# Patient Record
Sex: MALE | Race: WHITE | URBAN - METROPOLITAN AREA
[De-identification: names, ages, dates, MRNs, and addresses within clinical notes are randomized per-mention and may not be internally consistent; named-entity substitution may affect disease eponyms.]

---

## 2019-03-13 ENCOUNTER — OFFICE VISIT (OUTPATIENT)
Dept: FAMILY MEDICINE CLINIC | Age: 39
End: 2019-03-13
Payer: COMMERCIAL

## 2019-03-13 VITALS
WEIGHT: 172 LBS | HEART RATE: 65 BPM | BODY MASS INDEX: 25.48 KG/M2 | SYSTOLIC BLOOD PRESSURE: 118 MMHG | OXYGEN SATURATION: 96 % | DIASTOLIC BLOOD PRESSURE: 82 MMHG | HEIGHT: 69 IN

## 2019-03-13 DIAGNOSIS — R10.9 FLANK PAIN, ACUTE: Primary | ICD-10-CM

## 2019-03-13 DIAGNOSIS — Z00.00 ANNUAL PHYSICAL EXAM: ICD-10-CM

## 2019-03-13 PROCEDURE — 81000 URINALYSIS NONAUTO W/SCOPE: CPT | Performed by: NURSE PRACTITIONER

## 2019-03-13 PROCEDURE — 99395 PREV VISIT EST AGE 18-39: CPT | Performed by: NURSE PRACTITIONER

## 2019-03-13 SDOH — SOCIAL STABILITY: SOCIAL NETWORK
DO YOU BELONG TO ANY CLUBS OR ORGANIZATIONS SUCH AS CHURCH GROUPS UNIONS, FRATERNAL OR ATHLETIC GROUPS, OR SCHOOL GROUPS?: NO

## 2019-03-13 SDOH — ECONOMIC STABILITY: FOOD INSECURITY: WITHIN THE PAST 12 MONTHS, THE FOOD YOU BOUGHT JUST DIDN'T LAST AND YOU DIDN'T HAVE MONEY TO GET MORE.: NEVER TRUE

## 2019-03-13 SDOH — SOCIAL STABILITY: SOCIAL INSECURITY
WITHIN THE LAST YEAR, HAVE YOU BEEN KICKED, HIT, SLAPPED, OR OTHERWISE PHYSICALLY HURT BY YOUR PARTNER OR EX-PARTNER?: NO

## 2019-03-13 SDOH — HEALTH STABILITY: PHYSICAL HEALTH: ON AVERAGE, HOW MANY MINUTES DO YOU ENGAGE IN EXERCISE AT THIS LEVEL?: 120 MIN

## 2019-03-13 SDOH — HEALTH STABILITY: MENTAL HEALTH
STRESS IS WHEN SOMEONE FEELS TENSE, NERVOUS, ANXIOUS, OR CAN'T SLEEP AT NIGHT BECAUSE THEIR MIND IS TROUBLED. HOW STRESSED ARE YOU?: NOT AT ALL

## 2019-03-13 SDOH — SOCIAL STABILITY: SOCIAL NETWORK: HOW OFTEN DO YOU ATTEND CHURCH OR RELIGIOUS SERVICES?: 1 TO 4 TIMES PER YEAR

## 2019-03-13 SDOH — ECONOMIC STABILITY: TRANSPORTATION INSECURITY
IN THE PAST 12 MONTHS, HAS THE LACK OF TRANSPORTATION KEPT YOU FROM MEDICAL APPOINTMENTS OR FROM GETTING MEDICATIONS?: NO

## 2019-03-13 SDOH — SOCIAL STABILITY: SOCIAL INSECURITY
WITHIN THE LAST YEAR, HAVE TO BEEN RAPED OR FORCED TO HAVE ANY KIND OF SEXUAL ACTIVITY BY YOUR PARTNER OR EX-PARTNER?: NO

## 2019-03-13 SDOH — SOCIAL STABILITY: SOCIAL INSECURITY: WITHIN THE LAST YEAR, HAVE YOU BEEN HUMILIATED OR EMOTIONALLY ABUSED IN OTHER WAYS BY YOUR PARTNER OR EX-PARTNER?: NO

## 2019-03-13 SDOH — SOCIAL STABILITY: SOCIAL INSECURITY: WITHIN THE LAST YEAR, HAVE YOU BEEN AFRAID OF YOUR PARTNER OR EX-PARTNER?: NO

## 2019-03-13 SDOH — ECONOMIC STABILITY: TRANSPORTATION INSECURITY
IN THE PAST 12 MONTHS, HAS LACK OF TRANSPORTATION KEPT YOU FROM MEETINGS, WORK, OR FROM GETTING THINGS NEEDED FOR DAILY LIVING?: NO

## 2019-03-13 SDOH — SOCIAL STABILITY: SOCIAL NETWORK: HOW OFTEN DO YOU ATTENT MEETINGS OF THE CLUB OR ORGANIZATION YOU BELONG TO?: NEVER

## 2019-03-13 SDOH — SOCIAL STABILITY: SOCIAL NETWORK: HOW OFTEN DO YOU GET TOGETHER WITH FRIENDS OR RELATIVES?: ONCE A WEEK

## 2019-03-13 SDOH — HEALTH STABILITY: PHYSICAL HEALTH: ON AVERAGE, HOW MANY DAYS PER WEEK DO YOU ENGAGE IN MODERATE TO STRENUOUS EXERCISE (LIKE A BRISK WALK)?: 5 DAYS

## 2019-03-13 SDOH — SOCIAL STABILITY: SOCIAL NETWORK: IN A TYPICAL WEEK, HOW MANY TIMES DO YOU TALK ON THE PHONE WITH FAMILY, FRIENDS, OR NEIGHBORS?: TWICE A WEEK

## 2019-03-13 SDOH — ECONOMIC STABILITY: FOOD INSECURITY: WITHIN THE PAST 12 MONTHS, YOU WORRIED THAT YOUR FOOD WOULD RUN OUT BEFORE YOU GOT MONEY TO BUY MORE.: NEVER TRUE

## 2019-03-13 SDOH — ECONOMIC STABILITY: INCOME INSECURITY: HOW HARD IS IT FOR YOU TO PAY FOR THE VERY BASICS LIKE FOOD, HOUSING, MEDICAL CARE, AND HEATING?: NOT HARD AT ALL

## 2019-03-13 SDOH — SOCIAL STABILITY: SOCIAL NETWORK: ARE YOU MARRIED, WIDOWED, DIVORCED, SEPARATED, NEVER MARRIED, OR LIVING WITH A PARTNER?: NEVER MARRIED

## 2019-03-13 ASSESSMENT — PATIENT HEALTH QUESTIONNAIRE - PHQ9
SUM OF ALL RESPONSES TO PHQ9 QUESTIONS 1 & 2: 0
SUM OF ALL RESPONSES TO PHQ QUESTIONS 1-9: 0
2. FEELING DOWN, DEPRESSED OR HOPELESS: 0
SUM OF ALL RESPONSES TO PHQ QUESTIONS 1-9: 0
1. LITTLE INTEREST OR PLEASURE IN DOING THINGS: 0

## 2019-03-13 ASSESSMENT — ENCOUNTER SYMPTOMS
EYES NEGATIVE: 1
ALLERGIC/IMMUNOLOGIC NEGATIVE: 1
GASTROINTESTINAL NEGATIVE: 1
RESPIRATORY NEGATIVE: 1

## 2019-08-26 ENCOUNTER — OFFICE VISIT (OUTPATIENT)
Dept: FAMILY MEDICINE CLINIC | Age: 39
End: 2019-08-26
Payer: COMMERCIAL

## 2019-08-26 VITALS
TEMPERATURE: 100 F | SYSTOLIC BLOOD PRESSURE: 122 MMHG | BODY MASS INDEX: 24.4 KG/M2 | OXYGEN SATURATION: 98 % | DIASTOLIC BLOOD PRESSURE: 86 MMHG | WEIGHT: 165.2 LBS | HEART RATE: 82 BPM

## 2019-08-26 DIAGNOSIS — R50.9 FEVER, UNKNOWN ORIGIN: Primary | ICD-10-CM

## 2019-08-26 PROCEDURE — 99213 OFFICE O/P EST LOW 20 MIN: CPT | Performed by: NURSE PRACTITIONER

## 2019-08-26 ASSESSMENT — ENCOUNTER SYMPTOMS: COUGH: 1

## 2024-07-31 ENCOUNTER — OFFICE VISIT (OUTPATIENT)
Dept: FAMILY MEDICINE CLINIC | Age: 44
End: 2024-07-31

## 2024-07-31 VITALS
TEMPERATURE: 97.1 F | OXYGEN SATURATION: 96 % | SYSTOLIC BLOOD PRESSURE: 124 MMHG | DIASTOLIC BLOOD PRESSURE: 80 MMHG | HEIGHT: 69 IN | BODY MASS INDEX: 24.88 KG/M2 | HEART RATE: 65 BPM | WEIGHT: 168 LBS

## 2024-07-31 DIAGNOSIS — L98.9 SKIN LESION: Primary | ICD-10-CM

## 2024-07-31 DIAGNOSIS — L98.9 SKIN LESION OF LEFT ARM: ICD-10-CM

## 2024-07-31 RX ORDER — IMIQUIMOD 12.5 MG/.25G
CREAM TOPICAL
Qty: 24 EACH | Refills: 1 | Status: SHIPPED | OUTPATIENT
Start: 2024-07-31 | End: 2024-08-07

## 2024-07-31 SDOH — ECONOMIC STABILITY: FOOD INSECURITY: WITHIN THE PAST 12 MONTHS, THE FOOD YOU BOUGHT JUST DIDN'T LAST AND YOU DIDN'T HAVE MONEY TO GET MORE.: NEVER TRUE

## 2024-07-31 SDOH — ECONOMIC STABILITY: FOOD INSECURITY: WITHIN THE PAST 12 MONTHS, YOU WORRIED THAT YOUR FOOD WOULD RUN OUT BEFORE YOU GOT MONEY TO BUY MORE.: NEVER TRUE

## 2024-07-31 SDOH — ECONOMIC STABILITY: HOUSING INSECURITY
IN THE LAST 12 MONTHS, WAS THERE A TIME WHEN YOU DID NOT HAVE A STEADY PLACE TO SLEEP OR SLEPT IN A SHELTER (INCLUDING NOW)?: NO

## 2024-07-31 SDOH — ECONOMIC STABILITY: INCOME INSECURITY: HOW HARD IS IT FOR YOU TO PAY FOR THE VERY BASICS LIKE FOOD, HOUSING, MEDICAL CARE, AND HEATING?: NOT HARD AT ALL

## 2024-07-31 ASSESSMENT — ENCOUNTER SYMPTOMS
GASTROINTESTINAL NEGATIVE: 1
RESPIRATORY NEGATIVE: 1

## 2024-07-31 ASSESSMENT — PATIENT HEALTH QUESTIONNAIRE - PHQ9
SUM OF ALL RESPONSES TO PHQ QUESTIONS 1-9: 0
2. FEELING DOWN, DEPRESSED OR HOPELESS: NOT AT ALL
SUM OF ALL RESPONSES TO PHQ QUESTIONS 1-9: 0
1. LITTLE INTEREST OR PLEASURE IN DOING THINGS: NOT AT ALL
SUM OF ALL RESPONSES TO PHQ QUESTIONS 1-9: 0
SUM OF ALL RESPONSES TO PHQ QUESTIONS 1-9: 0
SUM OF ALL RESPONSES TO PHQ9 QUESTIONS 1 & 2: 0

## 2024-07-31 NOTE — PROGRESS NOTES
Obed Lakhani (:  1980) is a 43 y.o. male,Established patient, here for evaluation of the following chief complaint(s):  Rash (Armpit, groin and butt. On and off for couple months.)      ASSESSMENT/PLAN:  1. Skin lesion  Assessment & Plan:  Acute - new problem; appearance consistent with condyloma.  -Discussed possible diagnosis. Patient agreeable to biopsy. Verbal consent obtained after risks (infection, bleeding, scar, dyspigmentation), benefits and alternatives explained.   -Area(s) to be biopsied were marked with a surgical pen. Site(s) were cleansed with alcohol. Local anesthesia achieved with 1% lidocaine with epinephrine/sodium bicarbonate.  Punch biopsy performed with 2 mm punch. Hemostasis was achieved with aluminum chloride and manual pressure. The wound(s) were dressed with petrolatum and covered with a bandage. Specimen(s) sent to pathology. Pt educated re: risk of bleeding, infection, scar, discoloration, and wound care instructions.  Will send for pathology and follow up with results.  Will provide imiquimod for use to lesions 3 times a week.  Will follow-up with patient pending results.  Orders:  -     SURGICAL PATHOLOGY  -     SURGICAL PATHOLOGY  2. Skin lesion of left arm  Assessment & Plan:  New problem-uncertain etiology.  Lesion consistent with malignant melanoma versus nevi.  Punch biopsy obtained, specimen sent for pathology.  Patient instructed on postoperative care of site.  -Discussed possible diagnosis. Patient agreeable to biopsy. Verbal consent obtained after risks (infection, bleeding, scar, dyspigmentation), benefits and alternatives explained.   -Area(s) to be biopsied were marked with a surgical pen. Site(s) were cleansed with alcohol. Local anesthesia achieved with 1% lidocaine with epinephrine/sodium bicarbonate.  Punch biopsy performed with 2 mm punch.. Hemostasis was achieved with aluminum chloride and manual pressure. The wound(s) were dressed with petrolatum and covered

## 2024-07-31 NOTE — ASSESSMENT & PLAN NOTE
Acute - new problem; appearance consistent with condyloma.  -Discussed possible diagnosis. Patient agreeable to biopsy. Verbal consent obtained after risks (infection, bleeding, scar, dyspigmentation), benefits and alternatives explained.   -Area(s) to be biopsied were marked with a surgical pen. Site(s) were cleansed with alcohol. Local anesthesia achieved with 1% lidocaine with epinephrine/sodium bicarbonate.  Punch biopsy performed with 2 mm punch. Hemostasis was achieved with aluminum chloride and manual pressure. The wound(s) were dressed with petrolatum and covered with a bandage. Specimen(s) sent to pathology. Pt educated re: risk of bleeding, infection, scar, discoloration, and wound care instructions.  Will send for pathology and follow up with results.  Will provide imiquimod for use to lesions 3 times a week.  Will follow-up with patient pending results.

## 2024-08-01 PROBLEM — L98.9 SKIN LESION OF LEFT ARM: Status: ACTIVE | Noted: 2024-08-01

## 2024-08-01 NOTE — ASSESSMENT & PLAN NOTE
New problem-uncertain etiology.  Lesion consistent with malignant melanoma versus nevi.  Punch biopsy obtained, specimen sent for pathology.  Patient instructed on postoperative care of site.  -Discussed possible diagnosis. Patient agreeable to biopsy. Verbal consent obtained after risks (infection, bleeding, scar, dyspigmentation), benefits and alternatives explained.   -Area(s) to be biopsied were marked with a surgical pen. Site(s) were cleansed with alcohol. Local anesthesia achieved with 1% lidocaine with epinephrine/sodium bicarbonate.  Punch biopsy performed with 2 mm punch.. Hemostasis was achieved with aluminum chloride and manual pressure. The wound(s) were dressed with petrolatum and covered with a bandage. Specimen(s) sent to pathology. Pt educated re: risk of bleeding, infection, scar, discoloration, and wound care instructions.

## 2024-08-06 ENCOUNTER — TELEPHONE (OUTPATIENT)
Dept: ADMINISTRATIVE | Age: 44
End: 2024-08-06

## 2024-08-06 ENCOUNTER — OFFICE VISIT (OUTPATIENT)
Dept: FAMILY MEDICINE CLINIC | Age: 44
End: 2024-08-06
Payer: COMMERCIAL

## 2024-08-06 DIAGNOSIS — R21 RASH AND NONSPECIFIC SKIN ERUPTION: Primary | ICD-10-CM

## 2024-08-06 PROCEDURE — 99213 OFFICE O/P EST LOW 20 MIN: CPT | Performed by: NURSE PRACTITIONER

## 2024-08-06 RX ORDER — TACROLIMUS 1 MG/G
OINTMENT TOPICAL
Qty: 100 G | Refills: 2 | Status: SHIPPED | OUTPATIENT
Start: 2024-08-06 | End: 2024-08-07

## 2024-08-06 RX ORDER — CLOBETASOL PROPIONATE 0.5 MG/G
OINTMENT TOPICAL
Qty: 60 G | Refills: 3 | Status: SHIPPED | OUTPATIENT
Start: 2024-08-06 | End: 2024-08-07

## 2024-08-06 NOTE — TELEPHONE ENCOUNTER
Submitted PA for Tacrolimus 0.1% ointment  Via CMM Key: [id number]  STATUS: PENDING.    Follow up done daily; if no decision with in three days we will refax.  If another three days goes by with no decision will call the insurance for status.

## 2024-08-06 NOTE — PROGRESS NOTES
Obed Lakhani (:  1980) is a 43 y.o. male,Established patient, here for evaluation of the following chief complaint(s):  Results      ASSESSMENT/PLAN:  1. Rash and nonspecific skin eruption  Assessment & Plan:  Acute- ongoing  Ddx to include numular derm vs psoriasis  Bx results inconclusive  Protopic for intertriginous regions  Clobetasol to head  Follow up with derm- report forwarded to dermatology for additional review  Orders:  -     tacrolimus (PROTOPIC) 0.1 % ointment; Apply topically 2 times daily to genitalia, groin and all skin folds, Disp-100 g, R-2, Normal  -     Baltazar Carpio MD, Dermatology, [place]    No follow-ups on file.    SUBJECTIVE/OBJECTIVE:  2 months worsening rash  Started in bilat axilla and appeared in groin  Favor psoriasis, inflamed numular derm, psoriasis    Current Outpatient Medications   Medication Sig Dispense Refill    tacrolimus (PROTOPIC) 0.1 % ointment Apply topically 2 times daily to genitalia, groin and all skin folds 100 g 2    clobetasol (TEMOVATE) 0.05 % ointment Apply topically to scalp 2 times daily. 60 g 3     No current facility-administered medications for this visit.     MEDICATION LIST REVIEWED AND UPDATED AT TIME OF VISIT    Review of Systems   All other systems reviewed and are negative.      There were no vitals filed for this visit.    Physical Exam  Skin:     Findings: Rash present.         Anus    Left groin    Rt groin    Penile sulcus and head            Left axilla    Rt axilla        An electronic signature was used to authenticate this note.    --NIMISHA QUINONES, EULALIA - CNP

## 2024-08-06 NOTE — ASSESSMENT & PLAN NOTE
Acute- ongoing  Ddx to include numular derm vs psoriasis  Bx results inconclusive  Protopic for intertriginous regions  Clobetasol to head  Follow up with derm- report forwarded to dermatology for additional review

## 2024-08-07 RX ORDER — CLOBETASOL PROPIONATE 0.5 MG/G
OINTMENT TOPICAL
Qty: 60 G | Refills: 3 | Status: SHIPPED | OUTPATIENT
Start: 2024-08-07

## 2024-08-07 RX ORDER — TACROLIMUS 1 MG/G
OINTMENT TOPICAL
Qty: 100 G | Refills: 2 | Status: SHIPPED | OUTPATIENT
Start: 2024-08-07

## 2024-08-07 NOTE — TELEPHONE ENCOUNTER
DENIAL for Tacrolimus 0.1% ointment due to quantity limit; however, patient can get up to 2 grams per day without a PA.  Letter attached.    Please advise the pharmacy to run the claim for 2 grams per day.  Thank you!

## 2024-08-13 ENCOUNTER — TELEPHONE (OUTPATIENT)
Dept: DERMATOLOGY | Age: 44
End: 2024-08-13

## 2024-08-13 NOTE — TELEPHONE ENCOUNTER
Patient referred by Joanie Ochoa CNP for rash.  Dr Zavala agreeable to see patient.     Called and left message for patient to call back office.     Please offer patient opening for today @ 4:45pm if still available.

## 2024-08-14 NOTE — TELEPHONE ENCOUNTER
Called and left message for patient to call back office.      Please offer patient opening for 8/15 if still available.

## 2024-08-15 NOTE — TELEPHONE ENCOUNTER
Multiple attempts were made to contact patient with no success.   Will close out of telephone encounter.

## 2024-08-26 ENCOUNTER — OFFICE VISIT (OUTPATIENT)
Dept: FAMILY MEDICINE CLINIC | Age: 44
End: 2024-08-26
Payer: COMMERCIAL

## 2024-08-26 VITALS
HEART RATE: 69 BPM | HEIGHT: 69 IN | OXYGEN SATURATION: 99 % | DIASTOLIC BLOOD PRESSURE: 82 MMHG | WEIGHT: 171 LBS | TEMPERATURE: 96.9 F | BODY MASS INDEX: 25.33 KG/M2 | SYSTOLIC BLOOD PRESSURE: 116 MMHG

## 2024-08-26 DIAGNOSIS — M79.672 LEFT FOOT PAIN: ICD-10-CM

## 2024-08-26 DIAGNOSIS — R21 RASH AND NONSPECIFIC SKIN ERUPTION: Primary | ICD-10-CM

## 2024-08-26 PROCEDURE — G2211 COMPLEX E/M VISIT ADD ON: HCPCS | Performed by: NURSE PRACTITIONER

## 2024-08-26 PROCEDURE — 99214 OFFICE O/P EST MOD 30 MIN: CPT | Performed by: NURSE PRACTITIONER

## 2024-08-26 NOTE — ASSESSMENT & PLAN NOTE
Oh my goodness this is so much improved.  Although he does have occasional small scaly lesions appear these are all now well-controlled with the use of tacrolimus and clobetasol to his scalp.  He is almost 99% better than when previously seen in office.  I did tell him however that this may recur over time, we do not know exactly what triggers it is likely an autoimmune issue.  If he wishes to consider any sort of oral treatment with Biologics he would need to be seen by dermatology.  I have encouraged him to call Dr. Anshul Corona office to schedule an appointment to be seen.  This is something that would have to be taken care of within dermatology.

## 2024-08-26 NOTE — PROGRESS NOTES
Obed Lakhani (:  1980) is a 43 y.o. male,Established patient, here for evaluation of the following chief complaint(s):  Foot Swelling (Left bottom feet x 1 week. )         Assessment & Plan  Rash and nonspecific skin eruption    Oh my goodness this is so much improved.  Although he does have occasional small scaly lesions appear these are all now well-controlled with the use of tacrolimus and clobetasol to his scalp.  He is almost 99% better than when previously seen in office.  I did tell him however that this may recur over time, we do not know exactly what triggers it is likely an autoimmune issue.  If he wishes to consider any sort of oral treatment with Biologics he would need to be seen by dermatology.  I have encouraged him to call Dr. Anshul Corona office to schedule an appointment to be seen.  This is something that would have to be taken care of within dermatology.         Left foot pain    New problem, uncontrolled.  Symptoms consistent with Kohler's neuroma.  Patient given referral to orthopedics to have this evaluated.  In the interim he may try soft soled shoes, he may give himself a boot online from [employer] to help alleviate pressure on his forefoot.  He may take anti-inflammatories as he is able.    Orders:    Rodrigo Gibbs MD, Orthopedic Surgery (Foot, Ankle), [facility]      No follow-ups on file.       Subjective   Patient presents with 1 week of pain in the forefoot of his right foot.  He denies any injury to this however he did state he has been doing a lot of furniture moving the last week which required a lot of up-and-down stairs.  He states he feels pain and swelling in the pad between his first and second toe on that foot.  Additionally he reports his skin lesions have significantly improved with the use of tacrolimus and clobetasol.  They were almost completely healed at this time.      No Known Allergies    Current Outpatient Medications   Medication Sig Dispense

## 2024-08-27 ENCOUNTER — TELEPHONE (OUTPATIENT)
Dept: DERMATOLOGY | Age: 44
End: 2024-08-27

## 2024-09-12 ENCOUNTER — OFFICE VISIT (OUTPATIENT)
Dept: ORTHOPEDIC SURGERY | Age: 44
End: 2024-09-12

## 2024-09-12 VITALS — HEIGHT: 69 IN | BODY MASS INDEX: 25.33 KG/M2 | WEIGHT: 171 LBS

## 2024-09-12 DIAGNOSIS — M79.672 LEFT FOOT PAIN: ICD-10-CM

## 2024-09-12 DIAGNOSIS — M77.42 METATARSALGIA, LEFT FOOT: Primary | ICD-10-CM

## 2024-09-12 RX ORDER — NAPROXEN 500 MG/1
500 TABLET ORAL 2 TIMES DAILY WITH MEALS
Qty: 60 TABLET | Refills: 0 | Status: SHIPPED | OUTPATIENT
Start: 2024-09-12 | End: 2024-10-12

## 2024-10-16 ENCOUNTER — OFFICE VISIT (OUTPATIENT)
Dept: DERMATOLOGY | Age: 44
End: 2024-10-16
Payer: COMMERCIAL

## 2024-10-16 DIAGNOSIS — R21 RASH: Primary | ICD-10-CM

## 2024-10-16 PROCEDURE — 11102 TANGNTL BX SKIN SINGLE LES: CPT | Performed by: DERMATOLOGY

## 2024-10-16 PROCEDURE — 99203 OFFICE O/P NEW LOW 30 MIN: CPT | Performed by: DERMATOLOGY

## 2024-10-16 NOTE — PATIENT INSTRUCTIONS

## 2024-10-16 NOTE — PROGRESS NOTES
[facility] Dermatology  Baltazar Zavala MD  [phone]      Obed Lakhani  1980    43 y.o. male     Date of Visit: 10/16/2024    Chief Complaint: rash    History of Present Illness:    He presents today for 2-month history of an initially generalized and more severe eruption on the scalp, trunk and extremities including the groin and buttocks.  Biopsy performed by his primary care provider on 7/31/2024 revealed irregular epidermal acanthosis with pigment incontinence with overlying parakeratosis.  No diagnosis was rendered on the report.  He reports improvement with use of tacrolimus 0.1% ointment particularly in the groin and buttocks.  He had COVID prior to onset of the eruption.  He denies any strep throat.  He is monogamous with 1 partner for the last 2 years.      Review of Systems:  Gen: Feels well, good sense of health.  Musculoskeletal: No joint stiffness or pain.    Past Medical History, Family History, Surgical History, Medications and Allergies reviewed.    Past Medical History:   Diagnosis Date    Kidney stone      History reviewed. No pertinent surgical history.    No Known Allergies  Outpatient Medications Marked as Taking for the 10/16/24 encounter (Office Visit) with Baltazar Zavala MD   Medication Sig Dispense Refill    medical marijuana Take by mouth as needed.      naproxen (NAPROSYN) 500 MG tablet Take 1 tablet by mouth 2 times daily (with meals) 60 tablet 0    clobetasol (TEMOVATE) 0.05 % ointment Apply topically to scalp 2 times daily. 60 g 3    tacrolimus (PROTOPIC) 0.1 % ointment Apply 2 gm topically 2 times daily to genitalia, groin and all skin folds 100 g 2           Physical Examination       Well-appearing.    Scalp, trunk, and extremities including hands with several scattered small round scaly pink macules and patches.  Prior pictures reviewed with prominent well-defined smooth brightly erythematous smooth patches on the perianal region and proximal inner

## 2024-10-22 DIAGNOSIS — Z79.899 HIGH RISK MEDICATION USE: Primary | ICD-10-CM

## 2024-10-22 LAB — DERMATOLOGY PATHOLOGY REPORT: NORMAL

## 2024-10-23 ENCOUNTER — TELEPHONE (OUTPATIENT)
Dept: DERMATOLOGY | Age: 44
End: 2024-10-23

## 2024-10-24 ENCOUNTER — HOSPITAL ENCOUNTER (OUTPATIENT)
Age: 44
Discharge: HOME | End: 2024-10-24
Payer: COMMERCIAL

## 2024-10-24 DIAGNOSIS — Z79.899 ENCOUNTER FOR LONG-TERM (CURRENT) USE OF MEDICATIONS: Primary | ICD-10-CM

## 2024-10-24 DIAGNOSIS — Z79.899 HIGH RISK MEDICATION USE: ICD-10-CM

## 2024-10-24 LAB
ALBUMIN SERPL-MCNC: 4.6 G/DL (ref 3.4–5)
ALP SERPL-CCNC: 75 U/L (ref 40–129)
ALT SERPL-CCNC: 20 U/L (ref 10–40)
ANION GAP SERPL CALCULATED.3IONS-SCNC: 9 MMOL/L (ref 3–16)
AST SERPL-CCNC: 19 U/L (ref 15–37)
BASOPHILS # BLD: 0.1 K/UL (ref 0–0.2)
BASOPHILS NFR BLD: 0.9 %
BILIRUB DIRECT SERPL-MCNC: 0.2 MG/DL (ref 0–0.3)
BILIRUB INDIRECT SERPL-MCNC: 0.4 MG/DL (ref 0–1)
BILIRUB SERPL-MCNC: 0.6 MG/DL (ref 0–1)
BUN SERPL-MCNC: 11 MG/DL (ref 7–20)
CALCIUM SERPL-MCNC: 9.9 MG/DL (ref 8.3–10.6)
CHLORIDE SERPL-SCNC: 100 MMOL/L (ref 99–110)
CO2 SERPL-SCNC: 28 MMOL/L (ref 21–32)
CREAT SERPL-MCNC: 1.1 MG/DL (ref 0.9–1.3)
DEPRECATED RDW RBC AUTO: 15.7 % (ref 12.4–15.4)
EOSINOPHIL # BLD: 0.4 K/UL (ref 0–0.6)
EOSINOPHIL NFR BLD: 5.8 %
GFR SERPLBLD CREATININE-BSD FMLA CKD-EPI: 85 ML/MIN/{1.73_M2}
GLUCOSE SERPL-MCNC: 110 MG/DL (ref 70–99)
HBV CORE IGM SERPL QL IA: NORMAL
HBV SURFACE AB SERPL IA-ACNC: <3.5 MIU/ML
HBV SURFACE AG SERPL QL IA: NORMAL
HCT VFR BLD AUTO: 45.1 % (ref 40.5–52.5)
HCV AB SERPL QL IA: NORMAL
HGB BLD-MCNC: 15.6 G/DL (ref 13.5–17.5)
LYMPHOCYTES # BLD: 2.9 K/UL (ref 1–5.1)
LYMPHOCYTES NFR BLD: 41.7 %
MCH RBC QN AUTO: 28.8 PG (ref 26–34)
MCHC RBC AUTO-ENTMCNC: 34.5 G/DL (ref 31–36)
MCV RBC AUTO: 83.7 FL (ref 80–100)
MONOCYTES # BLD: 0.4 K/UL (ref 0–1.3)
MONOCYTES NFR BLD: 6.2 %
NEUTROPHILS # BLD: 3.2 K/UL (ref 1.7–7.7)
NEUTROPHILS NFR BLD: 45.4 %
PHOSPHATE SERPL-MCNC: 2.4 MG/DL (ref 2.5–4.9)
PLATELET # BLD AUTO: 298 K/UL (ref 135–450)
PMV BLD AUTO: 9.1 FL (ref 5–10.5)
POTASSIUM SERPL-SCNC: 4.2 MMOL/L (ref 3.5–5.1)
PROT SERPL-MCNC: 7.3 G/DL (ref 6.4–8.2)
RBC # BLD AUTO: 5.39 M/UL (ref 4.2–5.9)
SODIUM SERPL-SCNC: 137 MMOL/L (ref 136–145)
WBC # BLD AUTO: 7 K/UL (ref 4–11)

## 2024-10-24 PROCEDURE — 86705 HEP B CORE ANTIBODY IGM: CPT

## 2024-10-24 PROCEDURE — 80076 HEPATIC FUNCTION PANEL: CPT

## 2024-10-24 PROCEDURE — 86706 HEP B SURFACE ANTIBODY: CPT

## 2024-10-24 PROCEDURE — 80069 RENAL FUNCTION PANEL: CPT

## 2024-10-24 PROCEDURE — 36415 COLL VENOUS BLD VENIPUNCTURE: CPT

## 2024-10-24 PROCEDURE — 87340 HEPATITIS B SURFACE AG IA: CPT

## 2024-10-24 PROCEDURE — 86803 HEPATITIS C AB TEST: CPT

## 2024-10-24 PROCEDURE — 85025 COMPLETE CBC W/AUTO DIFF WBC: CPT

## 2024-10-24 RX ORDER — FOLIC ACID 1 MG/1
1 TABLET ORAL DAILY
Qty: 90 TABLET | Refills: 1 | Status: SHIPPED | OUTPATIENT
Start: 2024-10-24

## 2024-10-24 RX ORDER — METHOTREXATE 2.5 MG/1
15 TABLET ORAL WEEKLY
Qty: 24 TABLET | Refills: 2 | Status: SHIPPED | OUTPATIENT
Start: 2024-10-24

## 2024-11-14 ENCOUNTER — HOSPITAL ENCOUNTER (OUTPATIENT)
Age: 44
Discharge: HOME | End: 2024-11-14
Payer: COMMERCIAL

## 2024-11-14 DIAGNOSIS — Z79.899 ENCOUNTER FOR LONG-TERM (CURRENT) USE OF MEDICATIONS: ICD-10-CM

## 2024-11-14 LAB
ALT SERPL-CCNC: 35 U/L (ref 10–40)
AST SERPL-CCNC: 22 U/L (ref 15–37)
BASOPHILS # BLD: 0.1 K/UL (ref 0–0.2)
BASOPHILS NFR BLD: 1.1 %
CREAT SERPL-MCNC: 1.1 MG/DL (ref 0.9–1.3)
DEPRECATED RDW RBC AUTO: 15.9 % (ref 12.4–15.4)
EOSINOPHIL # BLD: 0.3 K/UL (ref 0–0.6)
EOSINOPHIL NFR BLD: 4.4 %
GFR SERPLBLD CREATININE-BSD FMLA CKD-EPI: 85 ML/MIN/{1.73_M2}
HCT VFR BLD AUTO: 44.1 % (ref 40.5–52.5)
HGB BLD-MCNC: 15.1 G/DL (ref 13.5–17.5)
LYMPHOCYTES # BLD: 2.7 K/UL (ref 1–5.1)
LYMPHOCYTES NFR BLD: 36 %
MCH RBC QN AUTO: 28.8 PG (ref 26–34)
MCHC RBC AUTO-ENTMCNC: 34.2 G/DL (ref 31–36)
MCV RBC AUTO: 84.3 FL (ref 80–100)
MONOCYTES # BLD: 0.5 K/UL (ref 0–1.3)
MONOCYTES NFR BLD: 7.1 %
NEUTROPHILS # BLD: 3.8 K/UL (ref 1.7–7.7)
NEUTROPHILS NFR BLD: 51.4 %
PLATELET # BLD AUTO: 313 K/UL (ref 135–450)
PMV BLD AUTO: 9 FL (ref 5–10.5)
RBC # BLD AUTO: 5.23 M/UL (ref 4.2–5.9)
WBC # BLD AUTO: 7.4 K/UL (ref 4–11)

## 2024-11-14 PROCEDURE — 85025 COMPLETE CBC W/AUTO DIFF WBC: CPT

## 2024-11-14 PROCEDURE — 36415 COLL VENOUS BLD VENIPUNCTURE: CPT

## 2024-11-14 PROCEDURE — 82565 ASSAY OF CREATININE: CPT

## 2024-11-14 PROCEDURE — 84450 TRANSFERASE (AST) (SGOT): CPT

## 2024-11-14 PROCEDURE — 84460 ALANINE AMINO (ALT) (SGPT): CPT

## 2025-01-27 ENCOUNTER — OFFICE VISIT (OUTPATIENT)
Age: 45
End: 2025-01-27
Payer: COMMERCIAL

## 2025-01-27 DIAGNOSIS — L40.9 PSORIASIS: Primary | ICD-10-CM

## 2025-01-27 PROCEDURE — 99213 OFFICE O/P EST LOW 20 MIN: CPT | Performed by: DERMATOLOGY

## 2025-01-27 NOTE — PROGRESS NOTES
[facility] Dermatology  Baltazar Zavala MD  [phone]      Obed Lakhani  1980    44 y.o. male     Date of Visit: 1/27/2025    Chief Complaint: psoriasis    History of Present Illness:    History of Present Illness  The patient is a 44-year-old  male presenting for a follow-up evaluation of psoriasis.    Psoriasis  - The onset of his condition occurred 5 months ago following a COVID-19 infection.  - A biopsy of the left ankle confirmed the diagnosis of psoriasis.  - Methotrexate has been effective in managing his symptoms, although he experiences a recurrence towards the end of the week.  He only took methotrexate for 1 month due to some confusion about how long he was to be on it.  - He manages intermittent psoriatic lesions in the inguinal and gluteal regions, as well as occasional xerotic patches on the penis, with tacrolimus 0.1% ointment.   - Soreness and erythema in the inguinal folds are also alleviated by tacrolimus.  - Lesions in the axillary regions have largely resolved, and scalp pruritus has subsided.  -He had some fatigue for a day or so when taking methotrexate but otherwise tolerated it well.  - A small psoriatic plaque on his elbow is asymptomatic, and lesions on his foot have resolved.    Supplemental information: The patient reports a history of hemorrhoids.    MEDICATIONS  Current: methotrexate, folic acid, tacrolimus        Past Medical History, Family History, Surgical History, Medications and Allergies reviewed.    Past Medical History:   Diagnosis Date    Kidney stone      No past surgical history on file.    No Known Allergies  Outpatient Medications Marked as Taking for the 1/27/25 encounter (Office Visit) with Baltazar Zavala MD   Medication Sig Dispense Refill    methotrexate (RHEUMATREX) 2.5 MG chemo tablet Take 6 tablets by mouth once a week 24 tablet 2    folic acid (FOLVITE) 1 MG tablet Take 1 tablet by mouth daily 90 tablet 1    medical marijuana Take

## 2025-04-01 ENCOUNTER — PATIENT MESSAGE (OUTPATIENT)
Age: 45
End: 2025-04-01

## 2025-04-01 DIAGNOSIS — K64.9 HEMORRHOIDS, UNSPECIFIED HEMORRHOID TYPE: Primary | ICD-10-CM

## 2025-04-08 ENCOUNTER — OFFICE VISIT (OUTPATIENT)
Dept: FAMILY MEDICINE CLINIC | Age: 45
End: 2025-04-08
Payer: COMMERCIAL

## 2025-04-08 VITALS
BODY MASS INDEX: 25.62 KG/M2 | SYSTOLIC BLOOD PRESSURE: 138 MMHG | WEIGHT: 173 LBS | HEART RATE: 78 BPM | DIASTOLIC BLOOD PRESSURE: 80 MMHG | HEIGHT: 69 IN | OXYGEN SATURATION: 98 %

## 2025-04-08 DIAGNOSIS — G43.E09 CHRONIC MIGRAINE WITH AURA WITHOUT STATUS MIGRAINOSUS, NOT INTRACTABLE: ICD-10-CM

## 2025-04-08 DIAGNOSIS — M25.542 ARTHRALGIA OF LEFT HAND: ICD-10-CM

## 2025-04-08 DIAGNOSIS — Z00.00 ANNUAL PHYSICAL EXAM: ICD-10-CM

## 2025-04-08 DIAGNOSIS — R10.9 FLANK PAIN: Primary | ICD-10-CM

## 2025-04-08 PROCEDURE — G2211 COMPLEX E/M VISIT ADD ON: HCPCS | Performed by: NURSE PRACTITIONER

## 2025-04-08 PROCEDURE — 99215 OFFICE O/P EST HI 40 MIN: CPT | Performed by: NURSE PRACTITIONER

## 2025-04-08 RX ORDER — IBUPROFEN 200 MG
200 TABLET ORAL EVERY 6 HOURS PRN
COMMUNITY

## 2025-04-08 SDOH — ECONOMIC STABILITY: FOOD INSECURITY: WITHIN THE PAST 12 MONTHS, THE FOOD YOU BOUGHT JUST DIDN'T LAST AND YOU DIDN'T HAVE MONEY TO GET MORE.: NEVER TRUE

## 2025-04-08 SDOH — ECONOMIC STABILITY: FOOD INSECURITY: WITHIN THE PAST 12 MONTHS, YOU WORRIED THAT YOUR FOOD WOULD RUN OUT BEFORE YOU GOT MONEY TO BUY MORE.: NEVER TRUE

## 2025-04-08 ASSESSMENT — PATIENT HEALTH QUESTIONNAIRE - PHQ9
SUM OF ALL RESPONSES TO PHQ QUESTIONS 1-9: 0
SUM OF ALL RESPONSES TO PHQ QUESTIONS 1-9: 0
1. LITTLE INTEREST OR PLEASURE IN DOING THINGS: NOT AT ALL
SUM OF ALL RESPONSES TO PHQ QUESTIONS 1-9: 0
SUM OF ALL RESPONSES TO PHQ QUESTIONS 1-9: 0
2. FEELING DOWN, DEPRESSED OR HOPELESS: NOT AT ALL

## 2025-04-08 NOTE — PROGRESS NOTES
The patient (or guardian, if applicable) and other individuals in attendance at the appointment consented to the use of AI, including the recording.          An electronic signature was used to authenticate this note.    --EULALIA PINON - CNP

## 2025-04-08 NOTE — PROGRESS NOTES
Systems   Neurological:  Positive for headaches.          Objective   Vitals:    04/08/25 1235   BP: 138/80   BP Site: Left Upper Arm   Patient Position: Sitting   BP Cuff Size: Medium Adult   Pulse: 78   SpO2: 98%   Weight: 78.5 kg (173 lb)   Height: 1.753 m (5' 9.02\")       Physical Exam             An electronic signature was used to authenticate this note.    --NIMISHA QUINONES, EULALIA - CNP

## 2025-04-09 ENCOUNTER — RESULTS FOLLOW-UP (OUTPATIENT)
Dept: FAMILY MEDICINE CLINIC | Age: 45
End: 2025-04-09

## 2025-04-09 ENCOUNTER — HOSPITAL ENCOUNTER (OUTPATIENT)
Age: 45
Discharge: HOME | End: 2025-04-09
Payer: COMMERCIAL

## 2025-04-09 DIAGNOSIS — Z00.00 ANNUAL PHYSICAL EXAM: ICD-10-CM

## 2025-04-09 LAB
ALBUMIN SERPL-MCNC: 4.9 G/DL (ref 3.4–5)
ALBUMIN/GLOB SERPL: 1.8 {RATIO} (ref 1.1–2.2)
ALP SERPL-CCNC: 80 U/L (ref 40–129)
ALT SERPL-CCNC: 26 U/L (ref 10–40)
ANION GAP SERPL CALCULATED.3IONS-SCNC: 12 MMOL/L (ref 3–16)
AST SERPL-CCNC: 18 U/L (ref 15–37)
BASOPHILS # BLD: 0.1 K/UL (ref 0–0.2)
BASOPHILS NFR BLD: 1.3 %
BILIRUB SERPL-MCNC: 0.4 MG/DL (ref 0–1)
BILIRUB UR QL STRIP.AUTO: NEGATIVE
BUN SERPL-MCNC: 14 MG/DL (ref 7–20)
CALCIUM SERPL-MCNC: 9.8 MG/DL (ref 8.3–10.6)
CHLORIDE SERPL-SCNC: 102 MMOL/L (ref 99–110)
CHOLEST SERPL-MCNC: 224 MG/DL (ref 0–199)
CLARITY UR: CLEAR
CO2 SERPL-SCNC: 26 MMOL/L (ref 21–32)
COLOR UR: YELLOW
CREAT SERPL-MCNC: 0.9 MG/DL (ref 0.9–1.3)
DEPRECATED RDW RBC AUTO: 14.5 % (ref 12.4–15.4)
EOSINOPHIL # BLD: 0.4 K/UL (ref 0–0.6)
EOSINOPHIL NFR BLD: 5 %
GFR SERPLBLD CREATININE-BSD FMLA CKD-EPI: >90 ML/MIN/{1.73_M2}
GLUCOSE SERPL-MCNC: 109 MG/DL (ref 70–99)
GLUCOSE UR STRIP.AUTO-MCNC: NEGATIVE MG/DL
HCT VFR BLD AUTO: 47 % (ref 40.5–52.5)
HDLC SERPL-MCNC: 40 MG/DL (ref 40–60)
HGB BLD-MCNC: 15.7 G/DL (ref 13.5–17.5)
HGB UR QL STRIP.AUTO: NEGATIVE
KETONES UR STRIP.AUTO-MCNC: NEGATIVE MG/DL
LDLC SERPL CALC-MCNC: 125 MG/DL
LEUKOCYTE ESTERASE UR QL STRIP.AUTO: NEGATIVE
LYMPHOCYTES # BLD: 2.3 K/UL (ref 1–5.1)
LYMPHOCYTES NFR BLD: 32 %
MCH RBC QN AUTO: 28.8 PG (ref 26–34)
MCHC RBC AUTO-ENTMCNC: 33.4 G/DL (ref 31–36)
MCV RBC AUTO: 86.4 FL (ref 80–100)
MONOCYTES # BLD: 0.4 K/UL (ref 0–1.3)
MONOCYTES NFR BLD: 5.7 %
NEUTROPHILS # BLD: 4 K/UL (ref 1.7–7.7)
NEUTROPHILS NFR BLD: 56 %
NITRITE UR QL STRIP.AUTO: NEGATIVE
PH UR STRIP.AUTO: 6 [PH] (ref 5–8)
PLATELET # BLD AUTO: 305 K/UL (ref 135–450)
PMV BLD AUTO: 9.7 FL (ref 5–10.5)
POTASSIUM SERPL-SCNC: 4.3 MMOL/L (ref 3.5–5.1)
PROT SERPL-MCNC: 7.7 G/DL (ref 6.4–8.2)
PROT UR STRIP.AUTO-MCNC: NEGATIVE MG/DL
RBC # BLD AUTO: 5.44 M/UL (ref 4.2–5.9)
SODIUM SERPL-SCNC: 140 MMOL/L (ref 136–145)
SP GR UR STRIP.AUTO: 1.01 (ref 1–1.03)
TRIGL SERPL-MCNC: 295 MG/DL (ref 0–150)
UA COMPLETE W REFLEX CULTURE PNL UR: NORMAL
UA DIPSTICK W REFLEX MICRO PNL UR: NORMAL
URN SPEC COLLECT METH UR: NORMAL
UROBILINOGEN UR STRIP-ACNC: 0.2 E.U./DL
VLDLC SERPL CALC-MCNC: 59 MG/DL
WBC # BLD AUTO: 7.1 K/UL (ref 4–11)

## 2025-04-09 PROCEDURE — 80061 LIPID PANEL: CPT

## 2025-04-09 PROCEDURE — 36415 COLL VENOUS BLD VENIPUNCTURE: CPT

## 2025-04-09 PROCEDURE — 85025 COMPLETE CBC W/AUTO DIFF WBC: CPT

## 2025-04-09 PROCEDURE — 81003 URINALYSIS AUTO W/O SCOPE: CPT

## 2025-04-09 PROCEDURE — 80053 COMPREHEN METABOLIC PANEL: CPT

## 2025-04-09 ASSESSMENT — ENCOUNTER SYMPTOMS
SINUS PAIN: 0
SINUS PRESSURE: 0
VOMITING: 0
DIARRHEA: 0
WHEEZING: 0
EYE ITCHING: 0
EYE PAIN: 0
RHINORRHEA: 0
BACK PAIN: 0
CONSTIPATION: 0
NAUSEA: 0
STRIDOR: 0
CHEST TIGHTNESS: 0
TROUBLE SWALLOWING: 0
SHORTNESS OF BREATH: 0
ABDOMINAL DISTENTION: 0
ABDOMINAL PAIN: 0
EYE DISCHARGE: 0
EYE REDNESS: 0
COUGH: 0

## 2025-04-09 NOTE — PROGRESS NOTES
flank pain.  Old records reviewed indicated he did have in 2013 a 6 mm stone in his right UPJ but these of likely passed over time.    Additionally he complains of pain over his left proximal thumb.  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  He has not used anything to try to decrease his discomfort.  Denies any weakness in his hand.  Denies any difficulty sleeping due to the discomfort.    Complains of ongoing headaches.  States he is getting about headache every other day throughout the month.  States they start up in the mornings and his temples of his forehead usually preceded by an aura.  They tend to last a couple hours.  He treats these with Tylenol or Motrin.  He does state he often waits a few hours until they are severe before he medicates.  Denies any visual difficulties or unilateral weakness with this or any difficulty speaking or swallowing.  Denies any photophobia or phonophobia nausea or vomiting.  States he did have a history of multiple migraines when he was a child but these were never worked up and they seem to have resolved.  Denies any recent changes in his life or health that may have triggered these.    Patient is also concerned about what he describes as crusty granules occasionally in his semen.  He only notes his course when he masturbates.  Denies any blood, denies any pain or burning with ejaculation or difficulty with ejaculation.  Denies any perineal pain, denies any lower abdominal pain, denies any fever, denies any pain with urination    Pain  Associated symptoms include headaches. Pertinent negatives include no abdominal pain, chest pain, chills, congestion, coughing, fatigue, fever, joint swelling, myalgias, nausea, neck pain, rash or vomiting.   Headache    No Known Allergies    Current Outpatient Medications   Medication Sig Dispense Refill    diclofenac sodium (VOLTAREN) 1 % GEL Apply 2 g

## 2025-04-16 ENCOUNTER — OFFICE VISIT (OUTPATIENT)
Dept: SURGERY | Age: 45
End: 2025-04-16
Payer: COMMERCIAL

## 2025-04-16 VITALS — WEIGHT: 173.6 LBS | BODY MASS INDEX: 25.62 KG/M2

## 2025-04-16 DIAGNOSIS — K64.8 INFLAMED INTERNAL HEMORRHOID: Primary | ICD-10-CM

## 2025-04-16 PROCEDURE — 99203 OFFICE O/P NEW LOW 30 MIN: CPT | Performed by: SURGERY

## 2025-04-16 NOTE — PROGRESS NOTES
Subjective:     Patient is a 44 y.o. man with hemorrhoids     HPI: Mr. Lakhani reports several episodes of bright red blood with bowel movement. He also has had blood without bowel movement at times of coughing.     Patient Active Problem List    Diagnosis Date Noted    Metatarsalgia, left foot 09/12/2024    Skin lesion of left arm 08/01/2024    Rash and nonspecific skin eruption 07/31/2024     Past Medical History:   Diagnosis Date    Kidney stone       No past surgical history on file.   Not in a hospital admission.  No Known Allergies   Social History     Tobacco Use    Smoking status: Never    Smokeless tobacco: Never   Substance Use Topics    Alcohol use: Not Currently      Family History   Problem Relation Age of Onset    Heart Disease Mother 50    Hyperthyroidism Mother     Heart Attack Mother     Elevated Lipids Mother     No Known Problems Father           Objective:     GEN: appears well, no distress, appears stated age  PSYCH: normal mood, normal affect  NECK: no neck masses, trachea midline  ENT: moist oral mucosa; anicteric  SKIN: no rash or jaundice  CV: regular heart rate and rhythm  PULM: normal respiratory effort, no wheezing  GI: soft non tender  ANUS: examined with chaperone Astrid Thompson MA. Prolapsed inflamed internal hemorrhoid chronically out and with discoloration from chronic prolapse   EXT/NEURO: normal gait, strength/sensation grossly intact in all extremities      Assessment:     Chronically inflamed prolapsed internal hemorrhoid     Plan:     The RBA of excisional hemorrhoidectomy were reviewed. Discussed risk of bleeding given hemorrhoids are large blood vessels and that a mild amount of bleeding postoperatively is expected but that significant bleeding can occur and warrant a return trip to the operating room. Discussed small risk of infection. Discussed because of this goal of hemorrhoid operation is improvement in symptoms. Finally we reviewed pain expectations. Discussed

## 2025-04-29 ENCOUNTER — PATIENT MESSAGE (OUTPATIENT)
Dept: SURGERY | Age: 45
End: 2025-04-29

## 2025-05-01 NOTE — TELEPHONE ENCOUNTER
Yes that's fine to do surgery first. Should probably wait 4-6 weeks post surgery for colonoscopy     Thanks    Dr. Rashid

## 2025-05-20 ENCOUNTER — PREP FOR PROCEDURE (OUTPATIENT)
Dept: SURGERY | Age: 45
End: 2025-05-20

## 2025-05-20 ENCOUNTER — TELEPHONE (OUTPATIENT)
Dept: SURGERY | Age: 45
End: 2025-05-20

## 2025-05-20 RX ORDER — SODIUM CHLORIDE 9 MG/ML
INJECTION, SOLUTION INTRAVENOUS PRN
Status: CANCELLED | OUTPATIENT
Start: 2025-07-15

## 2025-05-20 RX ORDER — SODIUM CHLORIDE 0.9 % (FLUSH) 0.9 %
5-40 SYRINGE (ML) INJECTION PRN
Status: CANCELLED | OUTPATIENT
Start: 2025-07-15

## 2025-05-20 RX ORDER — SODIUM CHLORIDE 9 MG/ML
INJECTION, SOLUTION INTRAVENOUS CONTINUOUS
Status: CANCELLED | OUTPATIENT
Start: 2025-07-15

## 2025-05-20 RX ORDER — SODIUM CHLORIDE 0.9 % (FLUSH) 0.9 %
5-40 SYRINGE (ML) INJECTION EVERY 12 HOURS SCHEDULED
Status: CANCELLED | OUTPATIENT
Start: 2025-07-15

## 2025-05-22 ENCOUNTER — PATIENT MESSAGE (OUTPATIENT)
Dept: SURGERY | Age: 45
End: 2025-05-22

## 2025-05-22 ENCOUNTER — TELEPHONE (OUTPATIENT)
Dept: SURGERY | Age: 45
End: 2025-05-22

## 2025-05-22 PROBLEM — K64.9 HEMORRHOIDS: Status: ACTIVE | Noted: 2025-05-20

## 2025-05-22 NOTE — TELEPHONE ENCOUNTER
Patient has been scheduled for:    Procedure: Exc Hemorrhoidectomy  Date: 7/15  Time: 7:30  Arrival: 5:30  Hospital: [facility]    ASA or blood thinning medications?:  no  Any injectable medications for diabetes or weight loss GLP1 (\"tide\" medications)? no  Any SGLT-2 meds (\"flozin\" meds)? no    Prep? none    Pre-op? N/A    Post-op Appt? 8/11 at 12:45    Patient advised they will need a .  Y    Case request sent and prep for proc orders done   Y    Medication sent to Pharmacy:  N/A    Stents or ostomy marking?  N/A    Instructions have been mailed/emailed to:  My Chart    Added to outlook calendar  Y

## 2025-07-09 NOTE — PROGRESS NOTES
PRE-OP INSTRUCTIONS FOR SURGICAL PATIENTS          Our Pre-admission Testing Nurses tried and were unable to reach you today.  Please read the attached instructions if you did not listen to your voicemail. Please return call to Pre Admission Testing [phone] with any questions & to review medications/over the counter supplements & health history.    Follow all instructions provided to you from your surgeon's office, including your ARRIVAL TIME.   Arrange for someone to drive you home and be with you for the first 24 hours after discharge.     NOTE: at this time ONLY 2 ADULTS may accompany you   One person encouraged to stay at hospital entire time if outpatient surgery    Enter the MAIN entrance located on [address] and report to the surgical desk on the LEFT side of the lobby. Please park in the parking garage or there is free  Parking available after 7am for your use.    Bring your insurance card & photo ID with you to register.  Bring your medication list with you with dose and frequency listed (including over the counter medications/supplements)  Contact your ordering physician/surgeon for medication instructions as soon as possible, especially if taking blood thinners, aspirin, heart, or diabetic medication (as some may need to be stopped 1-week preop)  DO NOT EAT ANYTHING after MIDNIGHT prior to arrival for surgery.  NOTE:  you MUST follow your surgeon's instructions regarding eating/drinking as you will have very specific instructions to follow.  If you did not receive these, call your surgeon's office immediately.    No gum, candy, mints, or ice chips day of procedure.   Please refrain from drinking alcohol the day before or day of your procedure.   Do not use any recreational marijuana at least 24 hours or street drugs (heroin, cocaine) at minimum 5 days prior to your procedure.   Please do not smoke the day of your procedure.    Dress in loose, comfortable clothing appropriate for redressing

## 2025-07-14 ENCOUNTER — ANESTHESIA EVENT (OUTPATIENT)
Dept: OPERATING ROOM | Age: 45
End: 2025-07-14
Payer: COMMERCIAL

## 2025-07-15 ENCOUNTER — HOSPITAL ENCOUNTER (OUTPATIENT)
Age: 45
Setting detail: OUTPATIENT SURGERY
Discharge: HOME | End: 2025-07-15
Attending: SURGERY | Admitting: SURGERY
Payer: COMMERCIAL

## 2025-07-15 ENCOUNTER — ANESTHESIA (OUTPATIENT)
Dept: OPERATING ROOM | Age: 45
End: 2025-07-15
Payer: COMMERCIAL

## 2025-07-15 VITALS
TEMPERATURE: 96.8 F | HEIGHT: 69 IN | RESPIRATION RATE: 12 BRPM | SYSTOLIC BLOOD PRESSURE: 140 MMHG | WEIGHT: 166.2 LBS | DIASTOLIC BLOOD PRESSURE: 98 MMHG | BODY MASS INDEX: 24.62 KG/M2 | OXYGEN SATURATION: 100 % | HEART RATE: 66 BPM

## 2025-07-15 DIAGNOSIS — K64.9 HEMORRHOIDS: ICD-10-CM

## 2025-07-15 PROCEDURE — 88304 TISSUE EXAM BY PATHOLOGIST: CPT

## 2025-07-15 PROCEDURE — 7100000001 HC PACU RECOVERY - ADDTL 15 MIN: Performed by: SURGERY

## 2025-07-15 PROCEDURE — 46260 REMOVE IN/EX HEM GROUPS 2+: CPT | Performed by: SURGERY

## 2025-07-15 PROCEDURE — 3700000000 HC ANESTHESIA ATTENDED CARE: Performed by: SURGERY

## 2025-07-15 PROCEDURE — 3600000014 HC SURGERY LEVEL 4 ADDTL 15MIN: Performed by: SURGERY

## 2025-07-15 PROCEDURE — 7100000011 HC PHASE II RECOVERY - ADDTL 15 MIN: Performed by: SURGERY

## 2025-07-15 PROCEDURE — 6360000002 HC RX W HCPCS: Performed by: SURGERY

## 2025-07-15 PROCEDURE — 3600000004 HC SURGERY LEVEL 4 BASE: Performed by: SURGERY

## 2025-07-15 PROCEDURE — 7100000010 HC PHASE II RECOVERY - FIRST 15 MIN: Performed by: SURGERY

## 2025-07-15 PROCEDURE — 2580000003 HC RX 258: Performed by: ANESTHESIOLOGY

## 2025-07-15 PROCEDURE — 2500000003 HC RX 250 WO HCPCS: Performed by: SURGERY

## 2025-07-15 PROCEDURE — 2500000003 HC RX 250 WO HCPCS

## 2025-07-15 PROCEDURE — 2709999900 HC NON-CHARGEABLE SUPPLY: Performed by: SURGERY

## 2025-07-15 PROCEDURE — 6370000000 HC RX 637 (ALT 250 FOR IP): Performed by: ANESTHESIOLOGY

## 2025-07-15 PROCEDURE — 6360000002 HC RX W HCPCS

## 2025-07-15 PROCEDURE — 3700000001 HC ADD 15 MINUTES (ANESTHESIA): Performed by: SURGERY

## 2025-07-15 PROCEDURE — 7100000000 HC PACU RECOVERY - FIRST 15 MIN: Performed by: SURGERY

## 2025-07-15 RX ORDER — FENTANYL CITRATE 50 UG/ML
INJECTION, SOLUTION INTRAMUSCULAR; INTRAVENOUS
Status: DISCONTINUED | OUTPATIENT
Start: 2025-07-15 | End: 2025-07-15 | Stop reason: SDUPTHER

## 2025-07-15 RX ORDER — LIDOCAINE HYDROCHLORIDE 20 MG/ML
INJECTION, SOLUTION INTRAVENOUS
Status: DISCONTINUED | OUTPATIENT
Start: 2025-07-15 | End: 2025-07-15 | Stop reason: SDUPTHER

## 2025-07-15 RX ORDER — SODIUM CHLORIDE 9 MG/ML
INJECTION, SOLUTION INTRAVENOUS CONTINUOUS
Status: DISCONTINUED | OUTPATIENT
Start: 2025-07-15 | End: 2025-07-15 | Stop reason: HOSPADM

## 2025-07-15 RX ORDER — ONDANSETRON 2 MG/ML
INJECTION INTRAMUSCULAR; INTRAVENOUS
Status: DISCONTINUED | OUTPATIENT
Start: 2025-07-15 | End: 2025-07-15 | Stop reason: SDUPTHER

## 2025-07-15 RX ORDER — OXYCODONE AND ACETAMINOPHEN 5; 325 MG/1; MG/1
1-2 TABLET ORAL EVERY 6 HOURS PRN
Qty: 42 TABLET | Refills: 0 | Status: SHIPPED | OUTPATIENT
Start: 2025-07-15 | End: 2025-07-22

## 2025-07-15 RX ORDER — DOCUSATE SODIUM 100 MG/1
100 CAPSULE, LIQUID FILLED ORAL 2 TIMES DAILY
Qty: 60 CAPSULE | Refills: 0 | Status: SHIPPED | OUTPATIENT
Start: 2025-07-15 | End: 2025-08-14

## 2025-07-15 RX ORDER — DIPHENHYDRAMINE HYDROCHLORIDE 50 MG/ML
12.5 INJECTION, SOLUTION INTRAMUSCULAR; INTRAVENOUS
Status: DISCONTINUED | OUTPATIENT
Start: 2025-07-15 | End: 2025-07-15 | Stop reason: HOSPADM

## 2025-07-15 RX ORDER — HYDRALAZINE HYDROCHLORIDE 20 MG/ML
10 INJECTION INTRAMUSCULAR; INTRAVENOUS
Status: DISCONTINUED | OUTPATIENT
Start: 2025-07-15 | End: 2025-07-15 | Stop reason: HOSPADM

## 2025-07-15 RX ORDER — ROCURONIUM BROMIDE 10 MG/ML
INJECTION, SOLUTION INTRAVENOUS
Status: DISCONTINUED | OUTPATIENT
Start: 2025-07-15 | End: 2025-07-15 | Stop reason: SDUPTHER

## 2025-07-15 RX ORDER — MAGNESIUM HYDROXIDE 1200 MG/15ML
LIQUID ORAL CONTINUOUS PRN
Status: DISCONTINUED | OUTPATIENT
Start: 2025-07-15 | End: 2025-07-15 | Stop reason: HOSPADM

## 2025-07-15 RX ORDER — SODIUM CHLORIDE, SODIUM LACTATE, POTASSIUM CHLORIDE, CALCIUM CHLORIDE 600; 310; 30; 20 MG/100ML; MG/100ML; MG/100ML; MG/100ML
INJECTION, SOLUTION INTRAVENOUS CONTINUOUS
Status: DISCONTINUED | OUTPATIENT
Start: 2025-07-15 | End: 2025-07-15 | Stop reason: HOSPADM

## 2025-07-15 RX ORDER — SODIUM CHLORIDE 0.9 % (FLUSH) 0.9 %
5-40 SYRINGE (ML) INJECTION PRN
Status: DISCONTINUED | OUTPATIENT
Start: 2025-07-15 | End: 2025-07-15 | Stop reason: HOSPADM

## 2025-07-15 RX ORDER — LABETALOL HYDROCHLORIDE 5 MG/ML
10 INJECTION, SOLUTION INTRAVENOUS
Status: DISCONTINUED | OUTPATIENT
Start: 2025-07-15 | End: 2025-07-15 | Stop reason: HOSPADM

## 2025-07-15 RX ORDER — NALOXONE HYDROCHLORIDE 0.4 MG/ML
INJECTION, SOLUTION INTRAMUSCULAR; INTRAVENOUS; SUBCUTANEOUS
Status: DISCONTINUED | OUTPATIENT
Start: 2025-07-15 | End: 2025-07-15 | Stop reason: SDUPTHER

## 2025-07-15 RX ORDER — SODIUM CHLORIDE 9 MG/ML
INJECTION, SOLUTION INTRAVENOUS PRN
Status: DISCONTINUED | OUTPATIENT
Start: 2025-07-15 | End: 2025-07-15 | Stop reason: HOSPADM

## 2025-07-15 RX ORDER — PROPOFOL 10 MG/ML
INJECTION, EMULSION INTRAVENOUS
Status: DISCONTINUED | OUTPATIENT
Start: 2025-07-15 | End: 2025-07-15 | Stop reason: SDUPTHER

## 2025-07-15 RX ORDER — HYDROMORPHONE HYDROCHLORIDE 1 MG/ML
0.5 INJECTION, SOLUTION INTRAMUSCULAR; INTRAVENOUS; SUBCUTANEOUS EVERY 10 MIN PRN
Status: DISCONTINUED | OUTPATIENT
Start: 2025-07-15 | End: 2025-07-15 | Stop reason: HOSPADM

## 2025-07-15 RX ORDER — GLYCOPYRROLATE 0.2 MG/ML
INJECTION INTRAMUSCULAR; INTRAVENOUS
Status: DISCONTINUED | OUTPATIENT
Start: 2025-07-15 | End: 2025-07-15 | Stop reason: SDUPTHER

## 2025-07-15 RX ORDER — MIDAZOLAM HYDROCHLORIDE 1 MG/ML
INJECTION, SOLUTION INTRAMUSCULAR; INTRAVENOUS
Status: DISCONTINUED | OUTPATIENT
Start: 2025-07-15 | End: 2025-07-15 | Stop reason: SDUPTHER

## 2025-07-15 RX ORDER — SODIUM CHLORIDE 0.9 % (FLUSH) 0.9 %
5-40 SYRINGE (ML) INJECTION EVERY 12 HOURS SCHEDULED
Status: DISCONTINUED | OUTPATIENT
Start: 2025-07-15 | End: 2025-07-15 | Stop reason: HOSPADM

## 2025-07-15 RX ORDER — HYDROMORPHONE HYDROCHLORIDE 1 MG/ML
0.5 INJECTION, SOLUTION INTRAMUSCULAR; INTRAVENOUS; SUBCUTANEOUS EVERY 5 MIN PRN
Status: DISCONTINUED | OUTPATIENT
Start: 2025-07-15 | End: 2025-07-15 | Stop reason: HOSPADM

## 2025-07-15 RX ORDER — METOCLOPRAMIDE HYDROCHLORIDE 5 MG/ML
10 INJECTION INTRAMUSCULAR; INTRAVENOUS
Status: DISCONTINUED | OUTPATIENT
Start: 2025-07-15 | End: 2025-07-15 | Stop reason: HOSPADM

## 2025-07-15 RX ORDER — OXYCODONE AND ACETAMINOPHEN 5; 325 MG/1; MG/1
1 TABLET ORAL ONCE
Refills: 0 | Status: COMPLETED | OUTPATIENT
Start: 2025-07-15 | End: 2025-07-15

## 2025-07-15 RX ORDER — DEXAMETHASONE SODIUM PHOSPHATE 4 MG/ML
INJECTION, SOLUTION INTRA-ARTICULAR; INTRALESIONAL; INTRAMUSCULAR; INTRAVENOUS; SOFT TISSUE
Status: DISCONTINUED | OUTPATIENT
Start: 2025-07-15 | End: 2025-07-15 | Stop reason: SDUPTHER

## 2025-07-15 RX ORDER — MEPERIDINE HYDROCHLORIDE 25 MG/ML
12.5 INJECTION INTRAMUSCULAR; INTRAVENOUS; SUBCUTANEOUS EVERY 5 MIN PRN
Status: DISCONTINUED | OUTPATIENT
Start: 2025-07-15 | End: 2025-07-15 | Stop reason: HOSPADM

## 2025-07-15 RX ORDER — HEPARIN SODIUM 5000 [USP'U]/ML
INJECTION, SOLUTION INTRAVENOUS; SUBCUTANEOUS PRN
Status: DISCONTINUED | OUTPATIENT
Start: 2025-07-15 | End: 2025-07-15 | Stop reason: ALTCHOICE

## 2025-07-15 RX ORDER — FLUMAZENIL 0.1 MG/ML
INJECTION INTRAVENOUS
Status: DISCONTINUED | OUTPATIENT
Start: 2025-07-15 | End: 2025-07-15 | Stop reason: SDUPTHER

## 2025-07-15 RX ADMIN — HYDROMORPHONE HYDROCHLORIDE 0.5 MG: 1 INJECTION, SOLUTION INTRAMUSCULAR; INTRAVENOUS; SUBCUTANEOUS at 13:40

## 2025-07-15 RX ADMIN — ROCURONIUM BROMIDE 50 MG: 10 INJECTION, SOLUTION INTRAVENOUS at 12:54

## 2025-07-15 RX ADMIN — SUGAMMADEX 200 MG: 100 INJECTION, SOLUTION INTRAVENOUS at 13:40

## 2025-07-15 RX ADMIN — OXYCODONE HYDROCHLORIDE AND ACETAMINOPHEN 1 TABLET: 5; 325 TABLET ORAL at 15:20

## 2025-07-15 RX ADMIN — HYDROMORPHONE HYDROCHLORIDE 0.5 MG: 1 INJECTION, SOLUTION INTRAMUSCULAR; INTRAVENOUS; SUBCUTANEOUS at 13:17

## 2025-07-15 RX ADMIN — ONDANSETRON 4 MG: 2 INJECTION, SOLUTION INTRAMUSCULAR; INTRAVENOUS at 13:01

## 2025-07-15 RX ADMIN — NALOXONE HYDROCHLORIDE 0.04 MG: 0.4 INJECTION, SOLUTION INTRAMUSCULAR; INTRAVENOUS; SUBCUTANEOUS at 13:55

## 2025-07-15 RX ADMIN — SODIUM CHLORIDE, SODIUM LACTATE, POTASSIUM CHLORIDE, AND CALCIUM CHLORIDE: .6; .31; .03; .02 INJECTION, SOLUTION INTRAVENOUS at 12:50

## 2025-07-15 RX ADMIN — PROPOFOL 200 MG: 10 INJECTION, EMULSION INTRAVENOUS at 12:54

## 2025-07-15 RX ADMIN — FLUMAZENIL 0.5 MG: 0.1 INJECTION, SOLUTION INTRAVENOUS at 14:00

## 2025-07-15 RX ADMIN — FENTANYL CITRATE 100 MCG: 50 INJECTION INTRAMUSCULAR; INTRAVENOUS at 12:52

## 2025-07-15 RX ADMIN — GLYCOPYRROLATE 0.2 MG: 0.2 INJECTION INTRAMUSCULAR; INTRAVENOUS at 13:00

## 2025-07-15 RX ADMIN — LIDOCAINE HYDROCHLORIDE 100 MG: 20 INJECTION, SOLUTION INTRAVENOUS at 12:54

## 2025-07-15 RX ADMIN — PROPOFOL 50 MG: 10 INJECTION, EMULSION INTRAVENOUS at 13:40

## 2025-07-15 RX ADMIN — MIDAZOLAM HYDROCHLORIDE 2 MG: 1 INJECTION, SOLUTION INTRAMUSCULAR; INTRAVENOUS at 12:48

## 2025-07-15 RX ADMIN — DEXAMETHASONE SODIUM PHOSPHATE 4 MG: 4 INJECTION INTRA-ARTICULAR; INTRALESIONAL; INTRAMUSCULAR; INTRAVENOUS; SOFT TISSUE at 13:01

## 2025-07-15 ASSESSMENT — PAIN SCALES - GENERAL
PAINLEVEL_OUTOF10: 0
PAINLEVEL_OUTOF10: 2
PAINLEVEL_OUTOF10: 5
PAINLEVEL_OUTOF10: 0

## 2025-07-15 ASSESSMENT — PAIN DESCRIPTION - ONSET
ONSET: ON-GOING
ONSET: GRADUAL

## 2025-07-15 ASSESSMENT — PAIN DESCRIPTION - FREQUENCY
FREQUENCY: CONTINUOUS
FREQUENCY: CONTINUOUS

## 2025-07-15 ASSESSMENT — PAIN DESCRIPTION - DESCRIPTORS
DESCRIPTORS: NAGGING;SORE
DESCRIPTORS: DISCOMFORT;SORE

## 2025-07-15 ASSESSMENT — PAIN DESCRIPTION - PAIN TYPE: TYPE: SURGICAL PAIN

## 2025-07-15 ASSESSMENT — PAIN DESCRIPTION - LOCATION
LOCATION: PERINEUM
LOCATION: RECTUM

## 2025-07-15 ASSESSMENT — PAIN - FUNCTIONAL ASSESSMENT
PAIN_FUNCTIONAL_ASSESSMENT: PREVENTS OR INTERFERES WITH MANY ACTIVE NOT PASSIVE ACTIVITIES
PAIN_FUNCTIONAL_ASSESSMENT: 0-10

## 2025-07-15 NOTE — ANESTHESIA PRE PROCEDURE
Department of Anesthesiology  Preprocedure Note       Name:  Obed Lakhani   Age:  44 y.o.  :  1980                                          MRN:  [id number]         Date:  7/15/2025      Surgeon: Surgeon(s):  Ge Rashid MD    Procedure: Procedure(s):  EXCISIONAL HEMORRHOIDECTOMY    Medications prior to admission:   Prior to Admission medications    Medication Sig Start Date End Date Taking? Authorizing Provider   diclofenac sodium (VOLTAREN) 1 % GEL Apply 2 g topically 4 times daily 25  Yes Joanie Ochoa, APRN - CNP   ibuprofen (ADVIL;MOTRIN) 200 MG tablet Take 1 tablet by mouth every 6 hours as needed for Pain   Yes Provider, MD Nilam   naproxen (NAPROSYN) 500 MG tablet Take 1 tablet by mouth 2 times daily (with meals) 24  Rodrigo Wright MD       Current medications:    Current Facility-Administered Medications   Medication Dose Route Frequency Provider Last Rate Last Admin   • lactated ringers infusion   IntraVENous Continuous Shelia Bhatti DO       • sodium chloride flush 0.9 % injection 5-40 mL  5-40 mL IntraVENous 2 times per day Ge Rashid MD       • sodium chloride flush 0.9 % injection 5-40 mL  5-40 mL IntraVENous PRN Ge Rashid MD       • 0.9 % sodium chloride infusion   IntraVENous PRN Ge Rashid MD       • 0.9 % sodium chloride infusion   IntraVENous Continuous Ge Rashid MD           Allergies:  No Known Allergies    Problem List:    Patient Active Problem List   Diagnosis Code   • Rash and nonspecific skin eruption R21   • Skin lesion of left arm L98.9   • Metatarsalgia, left foot M77.42   • Hemorrhoids K64.9       Past Medical History:        Diagnosis Date   • Kidney stone        Past Surgical History:  History reviewed. No pertinent surgical history.    Social History:    Social History     Tobacco Use   • Smoking status: Never   • Smokeless tobacco: Never   Substance Use Topics   • Alcohol use: Not Currently

## 2025-07-15 NOTE — DISCHARGE INSTRUCTIONS
POST HEMORRHOID SURGERY CARE    Call Surgeon if you have:  Temperature greater than 101.4  Persistent nausea and vomiting  Severe uncontrolled pain  Redness, tenderness, or signs of infection (pain, swelling, redness, odor or green/yellow discharge around the site)  Difficulty breathing, headache or visual disturbances  Hives  Persistent dizziness or light-headedness  Extreme fatigue  Inability to urinate  Any other questions or concerns you may have after discharge    In an emergency, call 911 or go to an Emergency Department at a nearby hospital    It is important to bring a complete, current list of your medications to any medical appointments or hospitalizations.    REMINDER:   Carry a list of your medications and allergies with you at all times  Call your pharmacy at least 1 week in advance to refill prescriptions    Diet: Resume your usual diet. Consider mostly liquids over first 3 days post surgery. Good nutrition promotes healing. Increase fluid intake. Take stool softener twice daily but hold if diarrhea develops. You may not have a bowel movement for several days after hemorrhoid surgery. This is normal unless you feel stool is stuck at the exit and can't get out.     Pain medication: You may noticed increased pain in 48-72 hours when local numbing medication wears off. Take pain medication only as directed.     Wound Care: A small amount of bleeding is expected and normal. If severe bleeding develops please return to ER. You may wear a pad to protect your clothes. Warm bath 2-3 times daily will help with pain. You may also apply ice pack 10-15 minutes per hour while awake for pain. A small dissolvable sponge was placed in your bottom at the end of surgery. This may fall out prior to dissolving.     Activity:  Avoid heavy lifting (15 pounds) / straining for 1 week. We do not recommend hemorrhoid donut cushions but you may sit on a regular pillow. You may resume regular exercise in 1 weeks time. If still

## 2025-07-15 NOTE — H&P
Surgery Services: Colorectal Surgery   Resident History & Physical Note    Chief Complaint: prolapsed internal hemorrhoid    History of Present Illness:   Obed Lakhani is a 44 y.o. male with Hx kidney stones, diffuse rashes last year that were treated with MTX as well as in the perianal skin that is currently stable due to recent treatment with Voltaren. He has no other major past medical history or surgical history.    He reports that he has had hemorrhoids for several years that have never required  interventions. Five days ago he had more significant bright red blood following a bowel movement but he is only having minimal anal pain/bleeding since.    Past Medical History:        Diagnosis Date    Kidney stone        Past Surgical History:    History reviewed. No pertinent surgical history.    Allergies:  Patient has no known allergies.    Medications:   Home Meds  No current facility-administered medications on file prior to encounter.     Current Outpatient Medications on File Prior to Encounter   Medication Sig Dispense Refill    diclofenac sodium (VOLTAREN) 1 % GEL Apply 2 g topically 4 times daily 50 g 0    ibuprofen (ADVIL;MOTRIN) 200 MG tablet Take 1 tablet by mouth every 6 hours as needed for Pain      naproxen (NAPROSYN) 500 MG tablet Take 1 tablet by mouth 2 times daily (with meals) 60 tablet 0       Current Meds  lactated ringers infusion, Continuous  sodium chloride flush 0.9 % injection 5-40 mL, 2 times per day  sodium chloride flush 0.9 % injection 5-40 mL, PRN  0.9 % sodium chloride infusion, PRN  0.9 % sodium chloride infusion, Continuous      Family History:   Family History   Problem Relation Age of Onset    Heart Disease Mother 50    Hyperthyroidism Mother     Heart Attack Mother     Elevated Lipids Mother     No Known Problems Father        Social History:   TOBACCO:   reports that he has never smoked. He has never used smokeless tobacco.  ETOH:   reports that he does not currently use

## 2025-07-15 NOTE — PROGRESS NOTES
Vitals:    07/15/25 1530   BP: (!) 148/97   Pulse: 60   Resp: 16   Temp: 97 °F (36.1 °C)   SpO2:          Intake/Output Summary (Last 24 hours) at 7/15/2025 1547  Last data filed at 7/15/2025 1405  Gross per 24 hour   Intake 800 ml   Output 25 ml   Net 775 ml       Pain assessment:  present - adequately treated  Pain Level: 5    Patient transferred to care of [facility] RN.  Taken to [facility] per pacu transport. VSS. Awake, taking po. Mesh panties in place, dry. Pt must void prior to discharge. Family updated. All belongings w/pt.    7/15/2025 3:47 PM

## 2025-07-15 NOTE — OP NOTE
Operative Note      Patient: Obed Lakhani  YOB: 1980  MRN: [id number]    Date of Procedure: 7/15/2025    Pre-Op Diagnosis Codes:      * Hemorrhoids [K64.9]    Post-Op Diagnosis: Same       Procedure(s):  EXCISIONAL HEMORRHOIDECTOMY x 2    Surgeon(s):  Ge Rashid MD    Assistant:   Resident: Kaylan Avina MD    Anesthesia: General    Estimated Blood Loss (mL): Minimal    Complications: None    Specimens:   ID Type Source Tests Collected by Time Destination   A : HEMORRHOIDS Tissue Tissue SURGICAL PATHOLOGY Ge Rashid MD 7/15/2025 1336            Findings:  Present At Time Of Surgery (PATOS) (choose all levels that have infection present):  No infection present  Other Findings: see full note    Detailed Description of Procedure:     After informed consent was obtained the patient was taken to the operating room. General anesthesia was given. Time out was called to confirm key components. The patient was placed in the prone position with appropriate padding. The patient was then prepped and draped in the usual sterile fashion.    We saw two large hemorrhoids left lateral and right posterior. The right posterior had thickening and whitening of the mucosa consistent with chronic prolapse. We placed a hemorrhoid clamp on this lesion and elevated it away from the underlying structures. We then placed a 3-0 chromic suture at the internal apex of the hemorrhoid. We then divided the skin/mucosa using a 15 scalpel blade. We divided the rest of the hemorrhoid with Ligasure. The 3-0 chromic was then used in a running locking fashion to close the defect with care taken to approximate mucosa to mucosa and skin to skin. A small amount of redundant skin was excised for better cosmetic effect and the distal aspect the incision left slightly open for drainage.     The left lateral hemorrhoid was removed in similar fashion. Hemostasis was seen at the conclusion of the case.      We injected Exparel for

## 2025-07-15 NOTE — PROGRESS NOTES
Pt admitted to PACU 10 from OR post HEMORRHOIDECTOMY per Dr. Rashid. PACU monitoring devices in place. Report received at bedside from CRNA. Pt restless on arrival to PACU. Once settled denies pain.

## 2025-07-15 NOTE — PROGRESS NOTES
Ambulatory Surgery/Procedure Discharge Note    Vitals:    07/15/25 1546   BP: (!) 140/98   Pulse: 66   Resp: 12   Temp: 96.8 °F (36 °C)   SpO2: 100%       In: 800 [I.V.:800]  Out: 425 [Urine:400]    Restroom use offered before discharge.  Yes    Pain assessment:  present - adequately treated  Pain Level: 2    Pt and S.O./family states \"ready to go home\". Pt alert and oriented x4. IV removed. Denies N/V.  Voided prior to discharge. Pt tolerating po intake. Discharge instructions given to pt and S.O./family with pt permission. Pt and S.O./family verbalized understanding of all instructions. Left with all belongings, 2 prescriptions, and discharge instructions.     Patient discharged to home/self care. Patient discharged via wheel chair by transporter to waiting family/S.O.

## 2025-07-15 NOTE — BRIEF OP NOTE
Brief Postoperative Note      Patient: Obed Lakhani  YOB: 1980  MRN: [id number]    Date of Procedure: 7/15/2025    Pre-Op Diagnosis Codes:      * Hemorrhoids [K64.9]    Post-Op Diagnosis: Same       Procedure(s):  EXCISIONAL HEMORRHOIDECTOMY    Surgeon(s):  Ge Rashid MD    Assistant:  Resident: Kaylan Avina MD    Anesthesia: General    Estimated Blood Loss (mL): Minimal    Complications: None    Specimens:   ID Type Source Tests Collected by Time Destination   A : HEMORRHOIDS Tissue Tissue SURGICAL PATHOLOGY Ge Rashid MD 7/15/2025 1336          Findings:  Present At Time Of Surgery (PATOS) (choose all levels that have infection present):  No infection present  Other Findings: see full note    Electronically signed by Ge Rashid MD on 7/15/2025 at 1:39 PM

## 2025-07-15 NOTE — ANESTHESIA POSTPROCEDURE EVALUATION
Department of Anesthesiology  Postprocedure Note    Patient: Obed Lakhani  MRN: [id number]  YOB: 1980  Date of evaluation: 7/15/2025    Procedure Summary       Date: 07/15/25 Room / Location: [address]    Anesthesia Start: 1250 Anesthesia Stop: 1415    Procedure: HEMORRHOIDECTOMY X 2 (Anus) Diagnosis:       Hemorrhoids      (Hemorrhoids [K64.9])    Surgeons: Ge Rashid MD Responsible Provider: Goyo Barksdale MD    Anesthesia Type: general ASA Status: 2            Anesthesia Type: No value filed.    Quoc Phase I: Quoc Score: 10    Quoc Phase II:      Anesthesia Post Evaluation    Patient location during evaluation: PACU  Patient participation: complete - patient participated  Level of consciousness: awake and alert  Pain score: 0  Airway patency: patent  Nausea & Vomiting: no nausea and no vomiting  Cardiovascular status: hemodynamically stable  Respiratory status: acceptable  Hydration status: euvolemic  Pain management: adequate    No notable events documented.

## 2025-08-11 ENCOUNTER — OFFICE VISIT (OUTPATIENT)
Dept: SURGERY | Age: 45
End: 2025-08-11

## 2025-08-11 VITALS
OXYGEN SATURATION: 96 % | RESPIRATION RATE: 16 BRPM | DIASTOLIC BLOOD PRESSURE: 89 MMHG | HEART RATE: 80 BPM | SYSTOLIC BLOOD PRESSURE: 139 MMHG | WEIGHT: 171 LBS | BODY MASS INDEX: 25.25 KG/M2

## 2025-08-11 DIAGNOSIS — K64.9 HEMORRHOIDS, UNSPECIFIED HEMORRHOID TYPE: Primary | ICD-10-CM

## 2025-08-11 PROCEDURE — 99024 POSTOP FOLLOW-UP VISIT: CPT | Performed by: SURGERY

## 2025-08-11 RX ORDER — TACROLIMUS 0.3 MG/G
1 OINTMENT TOPICAL 2 TIMES DAILY PRN
COMMUNITY

## 2025-08-18 ENCOUNTER — OFFICE VISIT (OUTPATIENT)
Age: 45
End: 2025-08-18
Payer: COMMERCIAL

## 2025-08-18 DIAGNOSIS — L40.9 PSORIASIS: Primary | ICD-10-CM

## 2025-08-18 DIAGNOSIS — L70.8 OTHER ACNE: ICD-10-CM

## 2025-08-18 PROCEDURE — 99213 OFFICE O/P EST LOW 20 MIN: CPT | Performed by: DERMATOLOGY

## 2025-08-18 RX ORDER — TACROLIMUS 0.3 MG/G
OINTMENT TOPICAL
Qty: 30 G | Refills: 2 | Status: SHIPPED | OUTPATIENT
Start: 2025-08-18

## (undated) DEVICE — GLOVE ORANGE PI 8   MSG9080

## (undated) DEVICE — PACK LAP IV REINF TBL CVR ADH UTIL UNDERBUTTOCK DRP W CUF

## (undated) DEVICE — Device

## (undated) DEVICE — GLOVE SURG SZ 85 L12IN FNGR THK79MIL GRN LTX FREE

## (undated) DEVICE — GOWN,SIRUS,POLYRNF,BRTHSLV,XL,30/CS: Brand: MEDLINE

## (undated) DEVICE — SUTURE CHROMIC GUT SZ 3-0 L27IN ABSRB BRN L26MM SH 1/2 CIR G122H

## (undated) DEVICE — SURGIFOAM SPNG SZ 100

## (undated) DEVICE — SHEET,T,THYROID,STERILE: Brand: MEDLINE

## (undated) DEVICE — RECTAL: Brand: MEDLINE INDUSTRIES, INC.

## (undated) DEVICE — TOWEL,STOP FLAG GOLD N-W: Brand: MEDLINE

## (undated) DEVICE — WET SKIN PREP TRAY: Brand: MEDLINE INDUSTRIES, INC.

## (undated) DEVICE — POSITIONER HD REST FOAM CMFRT TCH